# Patient Record
Sex: FEMALE | Race: BLACK OR AFRICAN AMERICAN
[De-identification: names, ages, dates, MRNs, and addresses within clinical notes are randomized per-mention and may not be internally consistent; named-entity substitution may affect disease eponyms.]

---

## 2018-05-30 ENCOUNTER — HOSPITAL ENCOUNTER (OUTPATIENT)
Dept: HOSPITAL 92 - ERS | Age: 73
Setting detail: OBSERVATION
LOS: 1 days | Discharge: HOME | End: 2018-05-31
Attending: INTERNAL MEDICINE | Admitting: INTERNAL MEDICINE
Payer: MEDICARE

## 2018-05-30 VITALS — BODY MASS INDEX: 18.1 KG/M2

## 2018-05-30 DIAGNOSIS — I50.9: ICD-10-CM

## 2018-05-30 DIAGNOSIS — J44.9: ICD-10-CM

## 2018-05-30 DIAGNOSIS — Z79.02: ICD-10-CM

## 2018-05-30 DIAGNOSIS — Z79.899: ICD-10-CM

## 2018-05-30 DIAGNOSIS — Z88.8: ICD-10-CM

## 2018-05-30 DIAGNOSIS — I48.91: ICD-10-CM

## 2018-05-30 DIAGNOSIS — K29.80: ICD-10-CM

## 2018-05-30 DIAGNOSIS — K92.0: ICD-10-CM

## 2018-05-30 DIAGNOSIS — C67.9: ICD-10-CM

## 2018-05-30 DIAGNOSIS — F17.210: ICD-10-CM

## 2018-05-30 DIAGNOSIS — Z79.82: ICD-10-CM

## 2018-05-30 DIAGNOSIS — K31.89: Primary | ICD-10-CM

## 2018-05-30 DIAGNOSIS — E78.5: ICD-10-CM

## 2018-05-30 DIAGNOSIS — I25.10: ICD-10-CM

## 2018-05-30 DIAGNOSIS — K22.2: ICD-10-CM

## 2018-05-30 DIAGNOSIS — I65.29: ICD-10-CM

## 2018-05-30 DIAGNOSIS — Z95.1: ICD-10-CM

## 2018-05-30 DIAGNOSIS — K92.1: ICD-10-CM

## 2018-05-30 DIAGNOSIS — Z88.5: ICD-10-CM

## 2018-05-30 DIAGNOSIS — Z86.73: ICD-10-CM

## 2018-05-30 DIAGNOSIS — D53.9: ICD-10-CM

## 2018-05-30 LAB
ALBUMIN SERPL BCG-MCNC: 4.4 G/DL (ref 3.4–4.8)
ALP SERPL-CCNC: 91 U/L (ref 40–150)
ALT SERPL W P-5'-P-CCNC: 7 U/L (ref 8–55)
ANION GAP SERPL CALC-SCNC: 15 MMOL/L (ref 10–20)
APTT PPP: 25.8 SEC (ref 22.9–36.1)
AST SERPL-CCNC: 24 U/L (ref 5–34)
BILIRUB SERPL-MCNC: 0.7 MG/DL (ref 0.2–1.2)
BUN SERPL-MCNC: 16 MG/DL (ref 9.8–20.1)
CALCIUM SERPL-MCNC: 10 MG/DL (ref 7.8–10.44)
CHLORIDE SERPL-SCNC: 98 MMOL/L (ref 98–107)
CK MB SERPL-MCNC: 0.7 NG/ML (ref 0–6.6)
CK SERPL-CCNC: 114 U/L (ref 29–168)
CO2 SERPL-SCNC: 28 MMOL/L (ref 23–31)
CREAT CL PREDICTED SERPL C-G-VRATE: 0 ML/MIN (ref 70–130)
GLOBULIN SER CALC-MCNC: 3.6 G/DL (ref 2.4–3.5)
GLUCOSE SERPL-MCNC: 135 MG/DL (ref 83–110)
HGB BLD-MCNC: 12.4 G/DL (ref 12–16)
INR PPP: 0.9
MCH RBC QN AUTO: 29.6 PG (ref 27–31)
MCV RBC AUTO: 91 FL (ref 81–99)
MDIFF COMPLETE?: YES
PLATELET # BLD AUTO: 296 THOU/UL (ref 130–400)
POTASSIUM SERPL-SCNC: 4.4 MMOL/L (ref 3.5–5.1)
PROTHROMBIN TIME: 12.6 SEC (ref 12–14.7)
RBC # BLD AUTO: 4.18 MILL/UL (ref 4.2–5.4)
SODIUM SERPL-SCNC: 137 MMOL/L (ref 136–145)
TROPONIN I SERPL DL<=0.01 NG/ML-MCNC: 0.01 NG/ML (ref ?–0.03)
WBC # BLD AUTO: 4.7 THOU/UL (ref 4.8–10.8)

## 2018-05-30 PROCEDURE — G0378 HOSPITAL OBSERVATION PER HR: HCPCS

## 2018-05-30 PROCEDURE — 99285 EMERGENCY DEPT VISIT HI MDM: CPT

## 2018-05-30 PROCEDURE — 96376 TX/PRO/DX INJ SAME DRUG ADON: CPT

## 2018-05-30 PROCEDURE — 96361 HYDRATE IV INFUSION ADD-ON: CPT

## 2018-05-30 PROCEDURE — 82274 ASSAY TEST FOR BLOOD FECAL: CPT

## 2018-05-30 PROCEDURE — C9113 INJ PANTOPRAZOLE SODIUM, VIA: HCPCS

## 2018-05-30 PROCEDURE — 84484 ASSAY OF TROPONIN QUANT: CPT

## 2018-05-30 PROCEDURE — 82550 ASSAY OF CK (CPK): CPT

## 2018-05-30 PROCEDURE — 0DB98ZX EXCISION OF DUODENUM, VIA NATURAL OR ARTIFICIAL OPENING ENDOSCOPIC, DIAGNOSTIC: ICD-10-PCS | Performed by: INTERNAL MEDICINE

## 2018-05-30 PROCEDURE — 96374 THER/PROPH/DIAG INJ IV PUSH: CPT

## 2018-05-30 PROCEDURE — 80048 BASIC METABOLIC PNL TOTAL CA: CPT

## 2018-05-30 PROCEDURE — 80053 COMPREHEN METABOLIC PANEL: CPT

## 2018-05-30 PROCEDURE — 85730 THROMBOPLASTIN TIME PARTIAL: CPT

## 2018-05-30 PROCEDURE — 82553 CREATINE MB FRACTION: CPT

## 2018-05-30 PROCEDURE — 43239 EGD BIOPSY SINGLE/MULTIPLE: CPT

## 2018-05-30 PROCEDURE — 88305 TISSUE EXAM BY PATHOLOGIST: CPT

## 2018-05-30 PROCEDURE — 86850 RBC ANTIBODY SCREEN: CPT

## 2018-05-30 PROCEDURE — 86901 BLOOD TYPING SEROLOGIC RH(D): CPT

## 2018-05-30 PROCEDURE — A4216 STERILE WATER/SALINE, 10 ML: HCPCS

## 2018-05-30 PROCEDURE — 93005 ELECTROCARDIOGRAM TRACING: CPT

## 2018-05-30 PROCEDURE — 85025 COMPLETE CBC W/AUTO DIFF WBC: CPT

## 2018-05-30 PROCEDURE — 86900 BLOOD TYPING SEROLOGIC ABO: CPT

## 2018-05-30 PROCEDURE — 36415 COLL VENOUS BLD VENIPUNCTURE: CPT

## 2018-05-30 PROCEDURE — 85610 PROTHROMBIN TIME: CPT

## 2018-05-31 VITALS — SYSTOLIC BLOOD PRESSURE: 174 MMHG | DIASTOLIC BLOOD PRESSURE: 85 MMHG

## 2018-05-31 VITALS — TEMPERATURE: 98.5 F

## 2018-05-31 LAB
ANION GAP SERPL CALC-SCNC: 10 MMOL/L (ref 10–20)
BASOPHILS # BLD AUTO: 0 THOU/UL (ref 0–0.2)
BASOPHILS NFR BLD AUTO: 1 % (ref 0–1)
BUN SERPL-MCNC: 16 MG/DL (ref 9.8–20.1)
CALCIUM SERPL-MCNC: 8.8 MG/DL (ref 7.8–10.44)
CHLORIDE SERPL-SCNC: 106 MMOL/L (ref 98–107)
CO2 SERPL-SCNC: 26 MMOL/L (ref 23–31)
CREAT CL PREDICTED SERPL C-G-VRATE: 41 ML/MIN (ref 70–130)
EOSINOPHIL # BLD AUTO: 0.1 THOU/UL (ref 0–0.7)
EOSINOPHIL NFR BLD AUTO: 2.6 % (ref 0–10)
GLUCOSE SERPL-MCNC: 88 MG/DL (ref 83–110)
HGB BLD-MCNC: 10.6 G/DL (ref 12–16)
LYMPHOCYTES # BLD: 1.4 THOU/UL (ref 1.2–3.4)
LYMPHOCYTES NFR BLD AUTO: 36.1 % (ref 21–51)
MCH RBC QN AUTO: 30 PG (ref 27–31)
MCV RBC AUTO: 90.2 FL (ref 81–99)
MONOCYTES # BLD AUTO: 0.3 THOU/UL (ref 0.11–0.59)
MONOCYTES NFR BLD AUTO: 8.1 % (ref 0–10)
NEUTROPHILS # BLD AUTO: 2 THOU/UL (ref 1.4–6.5)
NEUTROPHILS NFR BLD AUTO: 52.2 % (ref 42–75)
PLATELET # BLD AUTO: 227 THOU/UL (ref 130–400)
POTASSIUM SERPL-SCNC: 3.5 MMOL/L (ref 3.5–5.1)
RBC # BLD AUTO: 3.53 MILL/UL (ref 4.2–5.4)
SODIUM SERPL-SCNC: 138 MMOL/L (ref 136–145)
WBC # BLD AUTO: 3.7 THOU/UL (ref 4.8–10.8)

## 2018-06-08 ENCOUNTER — HOSPITAL ENCOUNTER (OUTPATIENT)
Dept: HOSPITAL 92 - LABBT | Age: 73
Discharge: HOME | End: 2018-06-08
Attending: UROLOGY
Payer: MEDICARE

## 2018-06-08 DIAGNOSIS — Z01.812: Primary | ICD-10-CM

## 2018-06-08 DIAGNOSIS — C67.9: ICD-10-CM

## 2018-06-08 LAB
BACTERIA UR QL AUTO: (no result) HPF
CRYSTAL-AUWI FLAG: 0 (ref 0–15)
HEV IGM SER QL: 8.7 (ref 0–7.99)
HYALINE CASTS #/AREA URNS LPF: (no result) LPF
PATHC CAST-AUWI FLAG: 0 (ref 0–2.49)
PROT UR STRIP.AUTO-MCNC: 300 MG/DL
RBC UR QL AUTO: (no result) HPF (ref 0–3)
SP GR UR STRIP: 1.02 (ref 1–1.04)
SPERM-AUWI FLAG: 0 (ref 0–9.9)
WBC UR QL AUTO: (no result) HPF (ref 0–3)
YEAST-AUWI FLAG: 0 (ref 0–25)

## 2018-06-08 PROCEDURE — 81001 URINALYSIS AUTO W/SCOPE: CPT

## 2018-06-08 PROCEDURE — 86900 BLOOD TYPING SEROLOGIC ABO: CPT

## 2018-06-08 PROCEDURE — 86901 BLOOD TYPING SEROLOGIC RH(D): CPT

## 2018-06-08 PROCEDURE — 86850 RBC ANTIBODY SCREEN: CPT

## 2018-06-08 PROCEDURE — 87086 URINE CULTURE/COLONY COUNT: CPT

## 2018-06-14 ENCOUNTER — HOSPITAL ENCOUNTER (EMERGENCY)
Dept: HOSPITAL 92 - ERS | Age: 73
Discharge: HOME | End: 2018-06-14
Payer: MEDICARE

## 2018-06-14 DIAGNOSIS — G44.209: Primary | ICD-10-CM

## 2018-06-14 DIAGNOSIS — J45.909: ICD-10-CM

## 2018-06-14 DIAGNOSIS — I10: ICD-10-CM

## 2018-06-14 DIAGNOSIS — Z79.899: ICD-10-CM

## 2018-06-14 DIAGNOSIS — F17.210: ICD-10-CM

## 2018-06-14 DIAGNOSIS — Z79.82: ICD-10-CM

## 2018-06-14 DIAGNOSIS — E11.9: ICD-10-CM

## 2018-06-14 DIAGNOSIS — F41.9: ICD-10-CM

## 2018-06-14 DIAGNOSIS — F31.9: ICD-10-CM

## 2018-06-14 DIAGNOSIS — E78.5: ICD-10-CM

## 2018-06-14 DIAGNOSIS — I50.9: ICD-10-CM

## 2018-06-14 DIAGNOSIS — F10.129: ICD-10-CM

## 2018-06-14 LAB
ALBUMIN SERPL BCG-MCNC: 4.2 G/DL (ref 3.4–4.8)
ALP SERPL-CCNC: 74 U/L (ref 40–150)
ALT SERPL W P-5'-P-CCNC: 8 U/L (ref 8–55)
ANION GAP SERPL CALC-SCNC: 16 MMOL/L (ref 10–20)
AST SERPL-CCNC: 23 U/L (ref 5–34)
BASOPHILS # BLD AUTO: 0.1 THOU/UL (ref 0–0.2)
BASOPHILS NFR BLD AUTO: 1.7 % (ref 0–1)
BILIRUB SERPL-MCNC: 0.2 MG/DL (ref 0.2–1.2)
BUN SERPL-MCNC: 25 MG/DL (ref 9.8–20.1)
CALCIUM SERPL-MCNC: 9.2 MG/DL (ref 7.8–10.44)
CHLORIDE SERPL-SCNC: 107 MMOL/L (ref 98–107)
CO2 SERPL-SCNC: 25 MMOL/L (ref 23–31)
CREAT CL PREDICTED SERPL C-G-VRATE: 0 ML/MIN (ref 70–130)
EOSINOPHIL # BLD AUTO: 0.1 THOU/UL (ref 0–0.7)
EOSINOPHIL NFR BLD AUTO: 1.3 % (ref 0–10)
GLOBULIN SER CALC-MCNC: 3.1 G/DL (ref 2.4–3.5)
GLUCOSE SERPL-MCNC: 72 MG/DL (ref 83–110)
HGB BLD-MCNC: 11 G/DL (ref 12–16)
LYMPHOCYTES # BLD: 2.5 THOU/UL (ref 1.2–3.4)
LYMPHOCYTES NFR BLD AUTO: 40.3 % (ref 21–51)
MCH RBC QN AUTO: 30 PG (ref 27–31)
MCV RBC AUTO: 89.7 FL (ref 81–99)
MONOCYTES # BLD AUTO: 0.4 THOU/UL (ref 0.11–0.59)
MONOCYTES NFR BLD AUTO: 6.4 % (ref 0–10)
NEUTROPHILS # BLD AUTO: 3.1 THOU/UL (ref 1.4–6.5)
NEUTROPHILS NFR BLD AUTO: 50.4 % (ref 42–75)
PLATELET # BLD AUTO: 363 THOU/UL (ref 130–400)
POTASSIUM SERPL-SCNC: 4 MMOL/L (ref 3.5–5.1)
RBC # BLD AUTO: 3.68 MILL/UL (ref 4.2–5.4)
SODIUM SERPL-SCNC: 144 MMOL/L (ref 136–145)
WBC # BLD AUTO: 6.2 THOU/UL (ref 4.8–10.8)

## 2018-06-14 PROCEDURE — 96365 THER/PROPH/DIAG IV INF INIT: CPT

## 2018-06-14 PROCEDURE — 80053 COMPREHEN METABOLIC PANEL: CPT

## 2018-06-14 PROCEDURE — 70450 CT HEAD/BRAIN W/O DYE: CPT

## 2018-06-14 PROCEDURE — 85025 COMPLETE CBC W/AUTO DIFF WBC: CPT

## 2018-06-14 PROCEDURE — 96375 TX/PRO/DX INJ NEW DRUG ADDON: CPT

## 2018-06-14 PROCEDURE — 80307 DRUG TEST PRSMV CHEM ANLYZR: CPT

## 2018-06-14 PROCEDURE — 96366 THER/PROPH/DIAG IV INF ADDON: CPT

## 2018-09-05 ENCOUNTER — HOSPITAL ENCOUNTER (OUTPATIENT)
Dept: HOSPITAL 92 - LABBT | Age: 73
Discharge: HOME | End: 2018-09-05
Attending: UROLOGY
Payer: MEDICARE

## 2018-09-05 DIAGNOSIS — C67.3: ICD-10-CM

## 2018-09-05 DIAGNOSIS — Z01.818: Primary | ICD-10-CM

## 2018-09-05 LAB
ANION GAP SERPL CALC-SCNC: 16 MMOL/L (ref 10–20)
APTT PPP: 28.8 SEC (ref 22.9–36.1)
BACTERIA UR QL AUTO: (no result) HPF
BASOPHILS # BLD AUTO: 0.1 THOU/UL (ref 0–0.2)
BASOPHILS NFR BLD AUTO: 1.4 % (ref 0–1)
BUN SERPL-MCNC: 21 MG/DL (ref 9.8–20.1)
CALCIUM SERPL-MCNC: 9.8 MG/DL (ref 7.8–10.44)
CHLORIDE SERPL-SCNC: 105 MMOL/L (ref 98–107)
CO2 SERPL-SCNC: 23 MMOL/L (ref 23–31)
CREAT CL PREDICTED SERPL C-G-VRATE: 0 ML/MIN (ref 70–130)
CRYSTAL-AUWI FLAG: 0 (ref 0–15)
EOSINOPHIL # BLD AUTO: 0.1 THOU/UL (ref 0–0.7)
EOSINOPHIL NFR BLD AUTO: 2 % (ref 0–10)
GLUCOSE SERPL-MCNC: 133 MG/DL (ref 83–110)
HEV IGM SER QL: 4.7 (ref 0–7.99)
HGB BLD-MCNC: 12.3 G/DL (ref 12–16)
HYALINE CASTS #/AREA URNS LPF: (no result) LPF
INR PPP: 1
LYMPHOCYTES # BLD: 2.4 THOU/UL (ref 1.2–3.4)
LYMPHOCYTES NFR BLD AUTO: 33.8 % (ref 21–51)
MCH RBC QN AUTO: 29.6 PG (ref 27–31)
MCV RBC AUTO: 90.9 FL (ref 78–98)
MONOCYTES # BLD AUTO: 0.3 THOU/UL (ref 0.11–0.59)
MONOCYTES NFR BLD AUTO: 4.8 % (ref 0–10)
NEUTROPHILS # BLD AUTO: 4.1 THOU/UL (ref 1.4–6.5)
NEUTROPHILS NFR BLD AUTO: 58 % (ref 42–75)
PATHC CAST-AUWI FLAG: 0.39 (ref 0–2.49)
PLATELET # BLD AUTO: 293 THOU/UL (ref 130–400)
POTASSIUM SERPL-SCNC: 3.9 MMOL/L (ref 3.5–5.1)
PROT UR STRIP.AUTO-MCNC: 300 MG/DL
PROTHROMBIN TIME: 13.1 SEC (ref 12–14.7)
RBC # BLD AUTO: 4.14 MILL/UL (ref 4.2–5.4)
RBC UR QL AUTO: (no result) HPF (ref 0–3)
SODIUM SERPL-SCNC: 140 MMOL/L (ref 136–145)
SP GR UR STRIP: 1.02 (ref 1–1.04)
SPERM-AUWI FLAG: 0 (ref 0–9.9)
WBC # BLD AUTO: 7 THOU/UL (ref 4.8–10.8)
YEAST-AUWI FLAG: 0 (ref 0–25)

## 2018-09-05 PROCEDURE — 85730 THROMBOPLASTIN TIME PARTIAL: CPT

## 2018-09-05 PROCEDURE — 85025 COMPLETE CBC W/AUTO DIFF WBC: CPT

## 2018-09-05 PROCEDURE — 80048 BASIC METABOLIC PNL TOTAL CA: CPT

## 2018-09-05 PROCEDURE — 93005 ELECTROCARDIOGRAM TRACING: CPT

## 2018-09-05 PROCEDURE — 81001 URINALYSIS AUTO W/SCOPE: CPT

## 2018-09-05 PROCEDURE — 87086 URINE CULTURE/COLONY COUNT: CPT

## 2018-09-05 PROCEDURE — 85610 PROTHROMBIN TIME: CPT

## 2018-09-05 PROCEDURE — 93010 ELECTROCARDIOGRAM REPORT: CPT

## 2018-09-13 ENCOUNTER — HOSPITAL ENCOUNTER (INPATIENT)
Dept: HOSPITAL 92 - SDC | Age: 73
LOS: 3 days | Discharge: HOME | DRG: 669 | End: 2018-09-16
Attending: UROLOGY | Admitting: UROLOGY
Payer: MEDICARE

## 2018-09-13 VITALS — BODY MASS INDEX: 19.8 KG/M2

## 2018-09-13 DIAGNOSIS — I10: ICD-10-CM

## 2018-09-13 DIAGNOSIS — Z87.19: ICD-10-CM

## 2018-09-13 DIAGNOSIS — Z86.73: ICD-10-CM

## 2018-09-13 DIAGNOSIS — N99.71: ICD-10-CM

## 2018-09-13 DIAGNOSIS — J44.9: ICD-10-CM

## 2018-09-13 DIAGNOSIS — R31.0: ICD-10-CM

## 2018-09-13 DIAGNOSIS — I25.10: ICD-10-CM

## 2018-09-13 DIAGNOSIS — Z95.1: ICD-10-CM
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
## 2018-09-13 DIAGNOSIS — C67.8: Primary | ICD-10-CM

## 2018-09-13 PROCEDURE — 0TBB8ZZ EXCISION OF BLADDER, VIA NATURAL OR ARTIFICIAL OPENING ENDOSCOPIC: ICD-10-PCS | Performed by: UROLOGY

## 2018-09-13 PROCEDURE — 88307 TISSUE EXAM BY PATHOLOGIST: CPT

## 2018-09-13 PROCEDURE — 96374 THER/PROPH/DIAG INJ IV PUSH: CPT

## 2018-09-13 PROCEDURE — 94640 AIRWAY INHALATION TREATMENT: CPT

## 2018-09-13 PROCEDURE — 36415 COLL VENOUS BLD VENIPUNCTURE: CPT

## 2018-09-13 PROCEDURE — 85025 COMPLETE CBC W/AUTO DIFF WBC: CPT

## 2018-09-13 PROCEDURE — 80048 BASIC METABOLIC PNL TOTAL CA: CPT

## 2018-09-14 LAB
ANION GAP SERPL CALC-SCNC: 15 MMOL/L (ref 10–20)
BASOPHILS # BLD AUTO: 0 THOU/UL (ref 0–0.2)
BASOPHILS NFR BLD AUTO: 0.3 % (ref 0–1)
BUN SERPL-MCNC: 15 MG/DL (ref 9.8–20.1)
CALCIUM SERPL-MCNC: 8.5 MG/DL (ref 7.8–10.44)
CHLORIDE SERPL-SCNC: 105 MMOL/L (ref 98–107)
CO2 SERPL-SCNC: 19 MMOL/L (ref 23–31)
CREAT CL PREDICTED SERPL C-G-VRATE: 42 ML/MIN (ref 70–130)
EOSINOPHIL # BLD AUTO: 0 THOU/UL (ref 0–0.7)
EOSINOPHIL NFR BLD AUTO: 0.1 % (ref 0–10)
GLUCOSE SERPL-MCNC: 179 MG/DL (ref 83–110)
HGB BLD-MCNC: 10.4 G/DL (ref 12–16)
LYMPHOCYTES # BLD: 0.6 THOU/UL (ref 1.2–3.4)
LYMPHOCYTES NFR BLD AUTO: 8.6 % (ref 21–51)
MCH RBC QN AUTO: 30.7 PG (ref 27–31)
MCV RBC AUTO: 90.8 FL (ref 78–98)
MONOCYTES # BLD AUTO: 0.2 THOU/UL (ref 0.11–0.59)
MONOCYTES NFR BLD AUTO: 3.6 % (ref 0–10)
NEUTROPHILS # BLD AUTO: 5.8 THOU/UL (ref 1.4–6.5)
NEUTROPHILS NFR BLD AUTO: 87.5 % (ref 42–75)
PLATELET # BLD AUTO: 218 THOU/UL (ref 130–400)
POTASSIUM SERPL-SCNC: 4.1 MMOL/L (ref 3.5–5.1)
RBC # BLD AUTO: 3.41 MILL/UL (ref 4.2–5.4)
SODIUM SERPL-SCNC: 135 MMOL/L (ref 136–145)
WBC # BLD AUTO: 6.7 THOU/UL (ref 4.8–10.8)

## 2018-09-14 RX ADMIN — MULTIPLE VITAMINS W/ MINERALS TAB SCH TAB: TAB at 09:40

## 2018-09-14 RX ADMIN — TROSPIUM CHLORIDE SCH MG: 20 TABLET, FILM COATED ORAL at 21:47

## 2018-09-14 RX ADMIN — TROSPIUM CHLORIDE SCH MG: 20 TABLET, FILM COATED ORAL at 09:40

## 2018-09-15 RX ADMIN — MULTIPLE VITAMINS W/ MINERALS TAB SCH TAB: TAB at 09:53

## 2018-09-15 RX ADMIN — TROSPIUM CHLORIDE SCH MG: 20 TABLET, FILM COATED ORAL at 09:53

## 2018-09-15 RX ADMIN — TROSPIUM CHLORIDE SCH MG: 20 TABLET, FILM COATED ORAL at 20:51

## 2018-09-16 VITALS — DIASTOLIC BLOOD PRESSURE: 72 MMHG | TEMPERATURE: 98 F | SYSTOLIC BLOOD PRESSURE: 160 MMHG

## 2018-09-16 RX ADMIN — MULTIPLE VITAMINS W/ MINERALS TAB SCH TAB: TAB at 08:22

## 2018-09-16 RX ADMIN — TROSPIUM CHLORIDE SCH MG: 20 TABLET, FILM COATED ORAL at 08:22

## 2018-10-22 ENCOUNTER — HOSPITAL ENCOUNTER (OUTPATIENT)
Dept: HOSPITAL 92 - RAD | Age: 73
Discharge: HOME | End: 2018-10-22
Attending: UROLOGY
Payer: MEDICARE

## 2018-10-22 DIAGNOSIS — N32.89: ICD-10-CM

## 2018-10-22 DIAGNOSIS — R31.0: Primary | ICD-10-CM

## 2018-10-22 PROCEDURE — 74430 CONTRAST X-RAY BLADDER: CPT

## 2018-10-22 PROCEDURE — 51600 INJECTION FOR BLADDER X-RAY: CPT

## 2018-10-24 ENCOUNTER — HOSPITAL ENCOUNTER (OUTPATIENT)
Dept: HOSPITAL 92 - LABBT | Age: 73
Discharge: HOME | End: 2018-10-24
Attending: UROLOGY
Payer: MEDICARE

## 2018-10-24 DIAGNOSIS — C67.9: ICD-10-CM

## 2018-10-24 DIAGNOSIS — Z01.818: Primary | ICD-10-CM

## 2018-10-24 LAB
ANION GAP SERPL CALC-SCNC: 12 MMOL/L (ref 10–20)
APTT PPP: 30.8 SEC (ref 22.9–36.1)
BASOPHILS # BLD AUTO: 0 THOU/UL (ref 0–0.2)
BASOPHILS NFR BLD AUTO: 0.4 % (ref 0–1)
BUN SERPL-MCNC: 10 MG/DL (ref 9.8–20.1)
CALCIUM SERPL-MCNC: 9.6 MG/DL (ref 7.8–10.44)
CHLORIDE SERPL-SCNC: 105 MMOL/L (ref 98–107)
CO2 SERPL-SCNC: 23 MMOL/L (ref 23–31)
CREAT CL PREDICTED SERPL C-G-VRATE: 0 ML/MIN (ref 70–130)
EOSINOPHIL # BLD AUTO: 0.2 THOU/UL (ref 0–0.7)
EOSINOPHIL NFR BLD AUTO: 3.1 % (ref 0–10)
GLUCOSE SERPL-MCNC: 94 MG/DL (ref 83–110)
HGB BLD-MCNC: 11.1 G/DL (ref 12–16)
INR PPP: 1
LYMPHOCYTES # BLD: 2.3 THOU/UL (ref 1.2–3.4)
LYMPHOCYTES NFR BLD AUTO: 33.2 % (ref 21–51)
MCH RBC QN AUTO: 27.8 PG (ref 27–31)
MCV RBC AUTO: 88.2 FL (ref 78–98)
MONOCYTES # BLD AUTO: 0.5 THOU/UL (ref 0.11–0.59)
MONOCYTES NFR BLD AUTO: 7.2 % (ref 0–10)
NEUTROPHILS # BLD AUTO: 3.8 THOU/UL (ref 1.4–6.5)
NEUTROPHILS NFR BLD AUTO: 56.1 % (ref 42–75)
PLATELET # BLD AUTO: 377 THOU/UL (ref 130–400)
POTASSIUM SERPL-SCNC: 3.8 MMOL/L (ref 3.5–5.1)
PROTHROMBIN TIME: 13.5 SEC (ref 12–14.7)
RBC # BLD AUTO: 4 MILL/UL (ref 4.2–5.4)
SODIUM SERPL-SCNC: 136 MMOL/L (ref 136–145)
WBC # BLD AUTO: 6.8 THOU/UL (ref 4.8–10.8)

## 2018-10-24 PROCEDURE — 87086 URINE CULTURE/COLONY COUNT: CPT

## 2018-10-24 PROCEDURE — 93005 ELECTROCARDIOGRAM TRACING: CPT

## 2018-10-24 PROCEDURE — 85610 PROTHROMBIN TIME: CPT

## 2018-10-24 PROCEDURE — 93010 ELECTROCARDIOGRAM REPORT: CPT

## 2018-10-24 PROCEDURE — 85025 COMPLETE CBC W/AUTO DIFF WBC: CPT

## 2018-10-24 PROCEDURE — 80048 BASIC METABOLIC PNL TOTAL CA: CPT

## 2018-10-24 PROCEDURE — 85730 THROMBOPLASTIN TIME PARTIAL: CPT

## 2018-10-29 ENCOUNTER — HOSPITAL ENCOUNTER (OUTPATIENT)
Dept: HOSPITAL 92 - LABBT | Age: 73
Discharge: HOME | End: 2018-10-29
Attending: UROLOGY
Payer: MEDICARE

## 2018-10-29 DIAGNOSIS — Z01.812: Primary | ICD-10-CM

## 2018-10-29 DIAGNOSIS — C67.9: ICD-10-CM

## 2018-10-29 PROCEDURE — 86850 RBC ANTIBODY SCREEN: CPT

## 2018-10-29 PROCEDURE — 86900 BLOOD TYPING SEROLOGIC ABO: CPT

## 2018-10-29 PROCEDURE — 86901 BLOOD TYPING SEROLOGIC RH(D): CPT

## 2019-06-29 ENCOUNTER — HOSPITAL ENCOUNTER (INPATIENT)
Dept: HOSPITAL 92 - ERS | Age: 74
LOS: 2 days | Discharge: HOSPICE | DRG: 698 | End: 2019-07-01
Attending: INTERNAL MEDICINE | Admitting: INTERNAL MEDICINE
Payer: MEDICARE

## 2019-06-29 VITALS — BODY MASS INDEX: 12 KG/M2

## 2019-06-29 DIAGNOSIS — N17.9: ICD-10-CM

## 2019-06-29 DIAGNOSIS — Z94.89: ICD-10-CM

## 2019-06-29 DIAGNOSIS — R64: ICD-10-CM

## 2019-06-29 DIAGNOSIS — Z66: ICD-10-CM

## 2019-06-29 DIAGNOSIS — A41.9: ICD-10-CM

## 2019-06-29 DIAGNOSIS — T83.511A: Primary | ICD-10-CM

## 2019-06-29 DIAGNOSIS — G93.41: ICD-10-CM

## 2019-06-29 DIAGNOSIS — E87.5: ICD-10-CM

## 2019-06-29 DIAGNOSIS — E43: ICD-10-CM

## 2019-06-29 DIAGNOSIS — E11.9: ICD-10-CM

## 2019-06-29 DIAGNOSIS — I10: ICD-10-CM

## 2019-06-29 DIAGNOSIS — N39.0: ICD-10-CM

## 2019-06-29 DIAGNOSIS — Z74.01: ICD-10-CM

## 2019-06-29 DIAGNOSIS — F17.210: ICD-10-CM

## 2019-06-29 DIAGNOSIS — Z85.51: ICD-10-CM

## 2019-06-29 LAB
ALBUMIN SERPL BCG-MCNC: 3.8 G/DL (ref 3.4–4.8)
ALP SERPL-CCNC: 126 U/L (ref 40–150)
ALT SERPL W P-5'-P-CCNC: 9 U/L (ref 8–55)
ANALYZER IN CARDIO: (no result)
ANION GAP SERPL CALC-SCNC: 16 MMOL/L (ref 10–20)
ANION GAP SERPL CALC-SCNC: 21 MMOL/L (ref 10–20)
ANISOCYTOSIS BLD QL SMEAR: (no result) (100X)
APTT PPP: 32.1 SEC (ref 22.9–36.1)
AST SERPL-CCNC: 10 U/L (ref 5–34)
BACTERIA UR QL AUTO: (no result) HPF
BASE EXCESS STD BLDA CALC-SCNC: -15.6 MEQ/L
BILIRUB SERPL-MCNC: 0.4 MG/DL (ref 0.2–1.2)
BUN SERPL-MCNC: 115 MG/DL (ref 9.8–20.1)
BUN SERPL-MCNC: 121 MG/DL (ref 8.4–25.7)
CA-I BLDA-SCNC: 1.8 MMOL/L (ref 1.12–1.3)
CALCIUM SERPL-MCNC: 10.4 MG/DL (ref 7.8–10.44)
CALCIUM SERPL-MCNC: 10.4 MG/DL (ref 7.8–10.44)
CHLORIDE SERPL-SCNC: 113 MMOL/L (ref 98–107)
CHLORIDE SERPL-SCNC: 118 MMOL/L (ref 98–107)
CO2 SERPL-SCNC: 11 MMOL/L (ref 23–31)
CO2 SERPL-SCNC: 15 MMOL/L (ref 23–31)
CREAT CL PREDICTED SERPL C-G-VRATE: 0 ML/MIN (ref 70–130)
CREAT CL PREDICTED SERPL C-G-VRATE: 0 ML/MIN (ref 70–130)
CRYSTALS URNS QL MICRO: (no result) HPF
GLOBULIN SER CALC-MCNC: 4.7 G/DL (ref 2.4–3.5)
GLUCOSE SERPL-MCNC: 114 MG/DL (ref 83–110)
GLUCOSE SERPL-MCNC: 179 MG/DL (ref 83–110)
HCO3 BLDA-SCNC: 10.2 MEQ/L (ref 22–28)
HCT VFR BLDA CALC: 31 % (ref 36–47)
HGB BLD-MCNC: 11.6 G/DL (ref 14–18)
HGB BLDA-MCNC: 10.5 G/DL (ref 12–16)
HYALINE CASTS #/AREA URNS LPF: (no result) LPF
INR PPP: 1.1
MCH RBC QN AUTO: 27.6 PG (ref 27–31)
MCV RBC AUTO: 88 FL (ref 78–98)
MDIFF COMPLETE?: YES
PCO2 BLDA: 24.6 MMHG (ref 35–45)
PH BLDA: 7.24 [PH] (ref 7.35–7.45)
PLATELET # BLD AUTO: 370 THOU/UL (ref 130–400)
PO2 BLDA: 109.6 MMHG (ref 70–?)
POLYCHROMASIA BLD QL SMEAR: (no result) (100X)
POTASSIUM BLD-SCNC: 5.45 MMOL/L (ref 3.7–5.3)
POTASSIUM SERPL-SCNC: 4.3 MMOL/L (ref 3.5–5.1)
POTASSIUM SERPL-SCNC: 6.1 MMOL/L (ref 3.5–5.1)
PROT UR STRIP.AUTO-MCNC: (no result) MG/DL
PROTHROMBIN TIME: 14 SEC (ref 12–14.7)
RBC # BLD AUTO: 4.2 MILL/UL (ref 4.7–6.1)
RBC UR QL AUTO: (no result) HPF (ref 0–3)
SODIUM SERPL-SCNC: 139 MMOL/L (ref 136–145)
SODIUM SERPL-SCNC: 145 MMOL/L (ref 136–145)
SPECIMEN DRAWN FROM PATIENT: (no result)
WBC # BLD AUTO: 18.3 THOU/UL (ref 4.8–10.8)

## 2019-06-29 PROCEDURE — 85025 COMPLETE CBC W/AUTO DIFF WBC: CPT

## 2019-06-29 PROCEDURE — 83540 ASSAY OF IRON: CPT

## 2019-06-29 PROCEDURE — 85046 RETICYTE/HGB CONCENTRATE: CPT

## 2019-06-29 PROCEDURE — 94640 AIRWAY INHALATION TREATMENT: CPT

## 2019-06-29 PROCEDURE — 80202 ASSAY OF VANCOMYCIN: CPT

## 2019-06-29 PROCEDURE — 87086 URINE CULTURE/COLONY COUNT: CPT

## 2019-06-29 PROCEDURE — 82728 ASSAY OF FERRITIN: CPT

## 2019-06-29 PROCEDURE — 71045 X-RAY EXAM CHEST 1 VIEW: CPT

## 2019-06-29 PROCEDURE — 87040 BLOOD CULTURE FOR BACTERIA: CPT

## 2019-06-29 PROCEDURE — 81015 MICROSCOPIC EXAM OF URINE: CPT

## 2019-06-29 PROCEDURE — 82805 BLOOD GASES W/O2 SATURATION: CPT

## 2019-06-29 PROCEDURE — 83605 ASSAY OF LACTIC ACID: CPT

## 2019-06-29 PROCEDURE — 83550 IRON BINDING TEST: CPT

## 2019-06-29 PROCEDURE — 80053 COMPREHEN METABOLIC PANEL: CPT

## 2019-06-29 PROCEDURE — 85610 PROTHROMBIN TIME: CPT

## 2019-06-29 PROCEDURE — 93005 ELECTROCARDIOGRAM TRACING: CPT

## 2019-06-29 PROCEDURE — 80048 BASIC METABOLIC PNL TOTAL CA: CPT

## 2019-06-29 PROCEDURE — 70450 CT HEAD/BRAIN W/O DYE: CPT

## 2019-06-29 PROCEDURE — 82274 ASSAY TEST FOR BLOOD FECAL: CPT

## 2019-06-29 PROCEDURE — 36415 COLL VENOUS BLD VENIPUNCTURE: CPT

## 2019-06-29 PROCEDURE — 81003 URINALYSIS AUTO W/O SCOPE: CPT

## 2019-06-29 PROCEDURE — 85730 THROMBOPLASTIN TIME PARTIAL: CPT

## 2019-06-29 RX ADMIN — HEPARIN SODIUM SCH UNITS: 5000 INJECTION, SOLUTION INTRAVENOUS; SUBCUTANEOUS at 21:12

## 2019-06-29 NOTE — RAD
Exam: Chest one view



HISTORY:Altered mental status. Dehydration.



Comparison: 1/13/2016



FINDINGS:

Cardiac silhouette: Normal. Sternotomy wires are noted. There is elongation of the aorta.

Pulmonary vessels: Normal

Costophrenic angles: Clear



LUNGS: No masses or consolidation. Lungs are hyperinflated.

Pneumothorax: None



Osseous abnormalities: None



IMPRESSION: 

1. Atherosclerosis of the aorta

2. Hyperinflation. No acute cardiopulmonary process.



Reported By: Sabine Bonilla 

Electronically Signed:  6/29/2019 4:29 PM

## 2019-06-29 NOTE — CT
Exam: Head CT without contrast





HISTORY: Decreased appetite. Altered mental status.



COMPARISON: none





FINDINGS:

Hemorrhage: No intraparenchymal hemorrhage or extra-axial hematoma.



Brain parenchyma: Cortical gray-white matter differentiation is preserved. No mass effect or midline 
shift. Basilar cisterns are patent.White matter hypodensities due to chronic small vessel ischemic

changes are noted.

Ventricular system: Ventricles and sulci are patent and symmetric.

Calvarium: Intact.

Sinuses and mastoid air cells: Adequate aeration.



IMPRESSION:

No acute intracranial process.









Reported By: Sabine Bonilla 

Electronically Signed:  6/29/2019 4:21 PM

## 2019-06-30 LAB
ANION GAP SERPL CALC-SCNC: 15 MMOL/L (ref 10–20)
BUN SERPL-MCNC: 103 MG/DL (ref 9.8–20.1)
CALCIUM SERPL-MCNC: 9.5 MG/DL (ref 7.8–10.44)
CHLORIDE SERPL-SCNC: 123 MMOL/L (ref 98–107)
CO2 SERPL-SCNC: 17 MMOL/L (ref 23–31)
CREAT CL PREDICTED SERPL C-G-VRATE: 14 ML/MIN (ref 70–130)
GLUCOSE SERPL-MCNC: 104 MG/DL (ref 83–110)
HGB BLD-MCNC: 7.8 G/DL (ref 12–16)
IRON SERPL-MCNC: 40 UG/DL (ref 50–170)
MCH RBC QN AUTO: 28.1 PG (ref 27–31)
MCV RBC AUTO: 88.9 FL (ref 78–98)
MDIFF COMPLETE?: YES
PLATELET # BLD AUTO: 335 THOU/UL (ref 130–400)
POTASSIUM SERPL-SCNC: 4.3 MMOL/L (ref 3.5–5.1)
RBC # BLD AUTO: 2.77 MILL/UL (ref 4.2–5.4)
RETICS/RBC NFR: 2 % (ref 0.5–1.5)
SODIUM SERPL-SCNC: 151 MMOL/L (ref 136–145)
TARGETS BLD QL SMEAR: (no result) (100X)
UIBC SERPL-MCNC: 111 MCG/DL (ref 265–497)
WBC # BLD AUTO: 14 THOU/UL (ref 4.8–10.8)

## 2019-06-30 RX ADMIN — PIPERACILLIN SODIUM, TAZOBACTAM SODIUM SCH MLS: 2; .25 INJECTION, POWDER, LYOPHILIZED, FOR SOLUTION INTRAVENOUS at 09:26

## 2019-06-30 RX ADMIN — HEPARIN SODIUM SCH: 5000 INJECTION, SOLUTION INTRAVENOUS; SUBCUTANEOUS at 20:49

## 2019-06-30 RX ADMIN — HEPARIN SODIUM SCH UNITS: 5000 INJECTION, SOLUTION INTRAVENOUS; SUBCUTANEOUS at 15:23

## 2019-06-30 RX ADMIN — PIPERACILLIN SODIUM, TAZOBACTAM SODIUM SCH MLS: 2; .25 INJECTION, POWDER, LYOPHILIZED, FOR SOLUTION INTRAVENOUS at 01:51

## 2019-06-30 RX ADMIN — HEPARIN SODIUM SCH UNITS: 5000 INJECTION, SOLUTION INTRAVENOUS; SUBCUTANEOUS at 09:26

## 2019-06-30 RX ADMIN — PIPERACILLIN SODIUM, TAZOBACTAM SODIUM SCH MLS: 2; .25 INJECTION, POWDER, LYOPHILIZED, FOR SOLUTION INTRAVENOUS at 17:57

## 2019-06-30 NOTE — PDOC.PN
- Subjective


Encounter Start Date: 06/30/19


Encounter Start Time: 10:35





Mr. Ruelas was seen today in follow-up of hyperkalemia, and acute renal 

failure. She is more alert, but still will just stare at you, and not respond 

verbally.





- Objective


Resuscitation Status - Order Detail:





06/29/19 17:28


Resuscitation Status Routine 


   Resuscitation Status: FULL: Full Resuscitation


   Discussed with: Discussed with the patient's brother








MAR Reviewed: Yes


Vital Signs & Weight: 


 Vital Signs (12 hours)











  Temp Pulse Ox


 


 06/30/19 11:04  98.0 F 


 


 06/30/19 08:00   100


 


 06/30/19 07:25  98.0 F 


 


 06/30/19 03:20  97.6 F 








 Weight











Weight                         62 lb 5 oz











 Most Recent Monitor Data











Heart Rate from ECG            81


 


NIBP                           104/65


 


NIBP BP-Mean                   78


 


Respiration from ECG           12


 


SpO2                           100














I&O: 


 











 06/29/19 06/30/19 07/01/19





 06:59 06:59 06:59


 


Intake Total  2017 


 


Output Total  750 


 


Balance  1267 











Result Diagrams: 


 06/30/19 04:28





 06/30/19 04:28





Phys Exam





- Physical Examination


HEENT: PERRLA


Respiratory: no wheezing, no rales, no rhonchi, clear to auscultation bilateral


Cardiovascular: RRR, no significant murmur, no rub


Gastrointestinal: soft, non-tender, no distention, positive bowel sounds


Musculoskeletal: no edema, pulses present





Dx/Plan


(1) Hyperkalemia


Code(s): E87.5 - HYPERKALEMIA   Status: Acute   





(2) Acute kidney injury


Code(s): N17.9 - ACUTE KIDNEY FAILURE, UNSPECIFIED   Status: Acute   





(3) Severe protein-energy malnutrition


Code(s): E43 - UNSPECIFIED SEVERE PROTEIN-CALORIE MALNUTRITION   Status: 

Chronic   





(4) Bladder cancer


Status: Chronic   





- Plan





* Hyperkalemia- corrected


* Acute renal failure- likely from volume depletion- improved with hydration


* Severe malnutrition- chronic and has developed over a long period of time


* The patient's sister was at the bedside and informs me that the patient's 

wishes were to be on Hospice, and her brother is just " too emotional" and can 

not bare to let the patient go. She says the patient has a twin sister, who has 

MPOA, and will bring the paper work in to verify this. I have placed a 

Palliative care Consult to help with this as well. If what her sister says can 

be verified, then will change her code status, and convert her to comfort care 

only.

## 2019-06-30 NOTE — PDOC.EVN
Event Note





- Event Note


Event Note: 





The code status and wished of the patient were discussed with the patient's 

sister at bedside as previously mentioned in the progress note. ACP- 20 minutes.

## 2019-06-30 NOTE — CON
DATE OF CONSULTATION:  



HISTORY OF PRESENT ILLNESS:  Radha Ruelas is a 73-year-old cachectic 

American female, who was apparently brought into the hospital __________ altered

mental status.  She is in the MICU, reason for consult.  No family members are

present at the bedside, but history is obtained, and the diagnosis of bladder cancer

was made.  She went to [facility] for total bladder resection and an external bladder

device was placed in. 



This morning, she is in the MICU.  No verbal communication.  Looks cachectic,

malnourished. 



PAST MEDICAL HISTORY:  Obtained, apparently includes diabetes and bladder cancer.



PAST SURGICAL HISTORY:  Otherwise included previous coronary artery disease, cardiac

stents. 



SOCIAL HISTORY:  Alcohol history obtained by the ER.  Drinks five drinks a day.

Smokes a pack a day. 



CURRENT MEDICATION:  None.



ALLERGIES:  NONE. 



WE WILL TRY AND GET ADDITIONAL INFORMATION AS FAMILY ARRIVES.



PHYSICAL EXAMINATION:

GENERAL:  Foul smelling odor in the room. 

VITAL SIGNS:  Sats 100% on room air, temperature 98, blood pressure 129/67, and

pulse 87.  Urine is purple in color, unclear why it is purple. 

CHEST:  Decreased breath sounds.  No wheezing. 

CARDIAC:  Normal S1 and S2.  No gallops. 

ABDOMEN:  No masses.



LABORATORY DATA:  Blood gas shows pO2 is 109, pCO2 is 24 and pH 7.24.  Urine shows

4+ bacteria, 3+ triple phosphate, elevated protein.  Creatinine is 1.64, ,

sodium 151.  Chest x-ray was clear.  CT brain showed no acute process. 



IMPRESSION:  

1. Metabolic encephalopathy, sepsis syndrome, recent bladder resection and

transplant. 

2. Prerenal azotemia, severe deconditioning, cachexia. 

We will __________ get additional information as family arrives to see what the

ongoing care and plan is.  She is probably going to need nutritional support, either

a PEG or feeding tube.  Agree with empiric antibiotics, supportive care. 



Hydration.  Consider getting palliative care consult to see the patient. 



We will follow while in the MICU. 



Consultation note, 70 minutes, 50% direct patient care.







Job ID:  [id number]

## 2019-06-30 NOTE — HP
PRIMARY CARE PHYSICIAN:  Unknown.



CHIEF COMPLAINT:  Altered mental status.



HISTORY OF PRESENT ILLNESS:  Ms. Ruelas is a pleasant, 73-year-old female who is

currently obtunded and I cannot get a history from her.  The history is obtained

from discussion with the patient's brother over the phone.  Ms. Ruelas is a

73-year-old female who has a history of bladder cancer, which apparently was

recently diagnosed.  He says that she was seen a physician in [place] and recently

had a bladder transplant, and the brother says that they believe they "got

everything" and she did not require chemotherapy or radiation.  Since then

apparently she "signed herself into hospice," but her brother says he does not think

that she knew "what she was doing," and he came to check on her today and found her

confused and says that she has not been eating or drinking recently and apparently

appeared weak and then brought her into the hospital for evaluation.  She was

evaluated in the ER and lab work demonstrated that she has significant leukocytosis.

 She has an elevated potassium and her creatinine was also elevated and appears to

have urinary tract infection and she is being admitted for hyperkalemia, acute renal

failure, and urosepsis.  Otherwise, I am not able to get any additional history from

the patient as she is essentially of obtunded and stares blankly and is unable to

talk. 



REVIEW OF SYSTEMS:  Again, unable to obtain.



PAST MEDICAL HISTORY:  Significant for bladder cancer as well as prediabetes,

history of heart failure, and hypertension. 



PAST SURGICAL HISTORY:  She has had a bladder transplant approximately 1 month ago

as well as coronary artery bypass grafting 4 vessels and she also has a chronic

indwelling Britt catheter. 



FAMILY HISTORY:  Significant for diabetes and hypertension.



ALLERGIES:  NO KNOWN DRUG ALLERGIES.



SOCIAL HISTORY:  Her son lives with her as well as a "significant other," according

to the patient's brother, Fredi Manzanares, and he says that the son also has had 2

strokes in the past and is essentially debilitated and the significant other is also

bed-bound.  When asked how they go about getting groceries and food, he states that

the son is "able to do this."  He has revoked her hospice and says he wants her to

be a full code.  This is after discussion about her emaciated status and what

appears to be her previous wishes.  Unfortunately, she is not decisional in order to

make a decision at this time. 



MEDICATIONS:  Unknown.  He says that he will bring these by later.



PHYSICAL EXAMINATION:

GENERAL:  She is obtunded.  She is unable to speak.  She is emaciated.  VITAL SIGNS:

 Blood pressure is 127/92, heart rate is 112, respiratory rate of 18, temperature is

96.8. 

HEENT:  She has temporal wasting.  Her pupils are reactive.  Her sclerae are pale.

Throat, she has dry mucous membranes and poor dentition. NECK:  There is no

adenopathy.  No bruits.  She does have a quarter-size circular mass on the left

sternal cleidomastoid which appears to have some purulent drainage. 

LUNGS:  Clear to auscultation.  There is no wheezing, no rales, no rhonchi. 

CARDIOVASCULAR:  She has a normal S1 and S2.  I did not appreciate an S3 or S4.  No

murmurs, clicks, or rubs. 

ABDOMEN:  Scaphoid.  It is soft.  It is nontender and nondistended.  Positive for

bowel sounds.  There is no rebound or guarding. 

EXTREMITIES:  There is no edema.  She has significant muscle wasting. 

NEUROLOGIC:  Difficult to assess.  She is moving all of her extremities and appears

to be grossly nonfocal. 



LAB RESULTS:  White blood cell count 18.3, hemoglobin 11.6, hematocrit is 37,

platelet count is 370.  INR is 1.1.  Sodium is 139, potassium 6.1, chloride is 13,

CO2 is 11, BUN is 21, creatinine is 2.2, and glucose is 114.  Urinalysis is positive

nitrites, large leukocyte esterase, and 4+ bacteria. 



ASSESSMENT AND PLAN:  This is a 73-year-old female who was brought in acute renal

failure with hyperkalemia with urosepsis.  Currently, she is maintaining her blood

pressure and is mildly tachycardic.  In the ER, she has been given IV fluid

resuscitation, and for the hyperkalemia, she has been treated with glucose and

insulin as well as calcium chloride.  After discussion with the brother, she is a

full code.  She will be admitted to telemetry.  We will continue IV fluid

resuscitation and give her Kayexalate to help to correct the hyperkalemia. 

1. Sepsis due to urinary tract infection.  We will start her on broad-spectrum IV

antibiotics.  Blood and urine cultures have already been obtained from the emergency

room.  She is significantly acidotic, and as a result, we will give her some IV

bicarbonate and likely this is a result of the renal failure as well as sepsis. 

2. She is having some mild respiratory distress.  There was some concern that she

will not be able to keep up with the acidosis and could potentially be require

intubation since she is currently a full code, we will likely need to go ahead and

upgrade her status and consider ICU versus IMU admission and consult Pulmonary

Critical Care Medicine in the event that she requires intubation. 

3. Deep venous thrombosis prophylaxis will be done with sequential compression

devices and subcutaneous heparin.  She will also be placed on gastrointestinal

prophylaxis and her overall prognosis is guarded. 







Job ID:  [id number]

## 2019-07-01 VITALS — TEMPERATURE: 97.6 F

## 2019-07-01 LAB — VANCOMYCIN SERPL-MCNC: 8.7 UG/ML

## 2019-07-01 RX ADMIN — PIPERACILLIN SODIUM, TAZOBACTAM SODIUM SCH MLS: 2; .25 INJECTION, POWDER, LYOPHILIZED, FOR SOLUTION INTRAVENOUS at 08:58

## 2019-07-01 RX ADMIN — PIPERACILLIN SODIUM, TAZOBACTAM SODIUM SCH MLS: 2; .25 INJECTION, POWDER, LYOPHILIZED, FOR SOLUTION INTRAVENOUS at 02:58

## 2019-07-01 RX ADMIN — HEPARIN SODIUM SCH UNITS: 5000 INJECTION, SOLUTION INTRAVENOUS; SUBCUTANEOUS at 08:57

## 2019-07-01 NOTE — PQF
MARCUS WAN TONI MD

[id number]                                                             [facility]-
B12

[id number]                             

                                   

CLINICAL DOCUMENTATION IMPROVEMENT CLARIFICATION FORM:  ICD-10 Updated



PLEASE DO AN ADDENDUM TO THE PROGRESS NOTE WITH ANY DOCUMENTATION UPDATES OR 
ADDITIONS AND CARRY THROUGH TO DC SUMMARY.   THANK YOU.



DATE:    7/1                                                         ATTN:   
DR. JOSE COTE



Please exercise your independent, professional judgment in responding to the 
clarification form. Clinical indicators are provided on the bottom of this form 
for your review.



Please check appropriate box(es):



[  X]  Sepsis d/t UTI d/t CHRONIC INDWELLING SCHULTZ CATHETER

[  ]  Sepsis d/t UTI NOT d/t CHRONIC INDWELLING SCHULTZ CATHETER

[  ]  Severe sepsis with acute organ dysfunction of: ___________________________
________

        (Examples: respiratory failure, encephalopathy, acute kidney failure, 
other)



[  ] Other diagnosis ___________

[  ] Unable to determine



In addition, please specify:

Present on Admission (POA):  [ X ] Yes             [  ] No             [  ] 
Unable to determine



For continuity of documentation, please document condition throughout progress 
notes and discharge summary.  Thank You.



CLINICAL INDICATORS - SIGNS / SYMPTOMS / LABS



ER PRESENTATION 6/29:  ARRIVED W/SCHULTZ CATHETER IN PLACE

ER FINAL DIAGNOSES:  SEPSIS, UTI



H&P 6/29 ([facility]):  PAST SURGICAL HX:  ...SHE ALSO HAS A CHRONIC INDWELLING 
SCHULTZ CATHETER;  



ASSESSMENT:  SEPSIS D/T UTI



URINE:  POS NITRITES, LARGE L.E., 4+ BACTERIA



RISKS:

SEPSIS D/T UTI

CHRONIC INDWELLING SCHULTZ CATHETER



TREATMENT:

IV ANTIBIOTICS ( [facility] (6/29 - PRESENT)





THANK YOU!  Sandra



(This form is maintained as a part of the permanent medical record)

 2015 [email or web address].  All Rights Reserved

Sandra John RN, BSN    [email or web address]    Office:  [phone]

                                                              

[facility]

## 2019-07-01 NOTE — DIS
DATE OF ADMISSION:  06/29/2019



DATE OF DISCHARGE:  07/01/2019



DISCHARGE DISPOSITION:  Home.



PRIMARY DISCHARGE DIAGNOSES:  

1. Hyperkalemia.

2. Acute kidney injury.

3. Urinary tract infection with sepsis.

4. Bladder cancer.

5. Severe protein-calorie malnutrition.



DISCHARGE MEDICATIONS:  None.  These will be determined by the Hospice Team.



PROCEDURES DONE DURING ADMISSION:  The patient had a CT scan of the brain, which was

negative for any acute intracranial process.  The patient also had a chest x-ray,

which was negative for any infiltrate.  The code status was changed to DNR. 



HOSPITAL COURSE:  Ms. Ruelas is a 73-year-old female, who had originally been in

hospice at home.  It is unclear if this was due to bladder cancer.  However, this

was well-established and the patient had previous advanced directives stating what

her wishes were.  Her brother apparently came and was concerned about the way she

appeared and revoked hospice and brought her to the hospital.  Unfortunately, at

this time, the patient was undecisional and there was no other family at the bedside

and her old records were not viewable in the electronic records and therefore, this

could not be verified.  She was admitted and her code status was changed to full

code and started on treatment.  Her potassium was corrected and she was started on

fluids and IV antibiotics.  However, the following day, additional family members

came and verified 

that she did have advance directive that she did not want aggressive treatment.  She

wanted to be in hospice and that the brother was a bit emotional and was actually

overriding her wishes.  After meeting with the palliative care team, the family did

come to a consensus and the decision was made to honor her wishes and place her back

home on home hospice. 





Job ID:  [id number]

## 2019-07-01 NOTE — PDOC.PN
- Subjective


Encounter Start Date: 07/01/19


Encounter Start Time: 10:12





Ms. Ruelas was seen today in follow-up. She does not have any complaints. 





- Objective


Resuscitation Status - Order Detail:





06/29/19 17:28


Resuscitation Status Routine 


   Resuscitation Status: FULL: Full Resuscitation


   Discussed with: Discussed with the patient's brother








MAR Reviewed: Yes


Vital Signs & Weight: 


 Vital Signs (12 hours)











  Temp Pulse Ox


 


 07/01/19 08:00   98


 


 07/01/19 07:17  97.7 F 


 


 07/01/19 03:00  98.5 F 


 


 06/30/19 22:45  97.6 F 








 Weight











Weight                         64 lb 7 oz











 Most Recent Monitor Data











Heart Rate from ECG            67


 


NIBP                           132/62


 


NIBP BP-Mean                   85


 


Respiration from ECG           16


 


SpO2                           100














I&O: 


 











 06/30/19 07/01/19 07/02/19





 06:59 06:59 06:59


 


Intake Total 2017 2520 


 


Output Total 750 625 


 


Balance 1267 1895 











Result Diagrams: 


 06/30/19 04:28





 06/30/19 04:28





Phys Exam





- Physical Examination


+ temporal wasting


HEENT: PERRLA


Respiratory: no wheezing, no rales, no rhonchi, clear to auscultation bilateral


Cardiovascular: RRR, no significant murmur, no rub


Gastrointestinal: soft, non-tender, positive bowel sounds


Musculoskeletal: no edema





Dx/Plan


(1) Hyperkalemia


Code(s): E87.5 - HYPERKALEMIA   Status: Acute   





(2) Acute kidney injury


Code(s): N17.9 - ACUTE KIDNEY FAILURE, UNSPECIFIED   Status: Acute   





(3) Severe protein-energy malnutrition


Code(s): E43 - UNSPECIFIED SEVERE PROTEIN-CALORIE MALNUTRITION   Status: 

Chronic   





(4) Bladder cancer


Status: Chronic   





- Plan





* Hyperkalemia- replaced


* UTI


* Acute kidney injury


* Severe malnutrition


* The decision has been made to place the patietn back on Hospice


* Stable for discharge


.

## 2019-07-01 NOTE — PRG
DATE OF SERVICE:  07/01/2019



SUBJECTIVE:  Radha Ruelas remains encephalopathic.



OBJECTIVE:  VITAL SIGNS:  Blood pressure is 132/62, pulse 67, saturations are 98% on

room air, and temperature is 97. 

CHEST:  Decreased breath sounds.  No wheezing. 

CARDIAC:  Normal S1 and S2.  No gallops. 

ABDOMEN:  No masses.



LABORATORY DATA:  No lab was ordered.



IMPRESSION:  

1. Sepsis syndrome.

2. Urinary tract infection.

3. Status post bladder cancer.  She has been seeing Surgery in [place].



PLAN:  I spoke to the patient's two sisters yesterday.  If the patient clearly wants

to be in a DNR, that they supposed to bring an advance directive today. 



Comfort care.  She can probably be transferred out of the ICU and try and get

hopefully hospice consult. 







Job ID:  [id number]